# Patient Record
Sex: FEMALE | Race: WHITE
[De-identification: names, ages, dates, MRNs, and addresses within clinical notes are randomized per-mention and may not be internally consistent; named-entity substitution may affect disease eponyms.]

---

## 2018-07-30 ENCOUNTER — HOSPITAL ENCOUNTER (INPATIENT)
Dept: HOSPITAL 68 - ERH | Age: 48
LOS: 8 days | DRG: 885 | End: 2018-08-07
Attending: PSYCHIATRY & NEUROLOGY | Admitting: PSYCHIATRY & NEUROLOGY
Payer: COMMERCIAL

## 2018-07-30 VITALS — HEIGHT: 65 IN | BODY MASS INDEX: 29.99 KG/M2 | WEIGHT: 180 LBS

## 2018-07-30 DIAGNOSIS — F33.2: Primary | ICD-10-CM

## 2018-07-30 DIAGNOSIS — Z87.820: ICD-10-CM

## 2018-07-30 LAB
ABSOLUTE GRANULOCYTE CT: 1.8 /CUMM (ref 1.4–6.5)
BASOPHILS # BLD: 0 /CUMM (ref 0–0.2)
BASOPHILS NFR BLD: 0.7 % (ref 0–2)
EOSINOPHIL # BLD: 0.1 /CUMM (ref 0–0.7)
EOSINOPHIL NFR BLD: 2.8 % (ref 0–5)
ERYTHROCYTE [DISTWIDTH] IN BLOOD BY AUTOMATED COUNT: 14.1 % (ref 11.5–14.5)
GRANULOCYTES NFR BLD: 48.4 % (ref 42.2–75.2)
HCT VFR BLD CALC: 34.7 % (ref 37–47)
LYMPHOCYTES # BLD: 1.4 /CUMM (ref 1.2–3.4)
MCH RBC QN AUTO: 30.1 PG (ref 27–31)
MCHC RBC AUTO-ENTMCNC: 34.2 G/DL (ref 33–37)
MCV RBC AUTO: 88.2 FL (ref 81–99)
MONOCYTES # BLD: 0.4 /CUMM (ref 0.1–0.6)
PLATELET # BLD: 304 /CUMM (ref 130–400)
PMV BLD AUTO: 6.8 FL (ref 7.4–10.4)
RED BLOOD CELL CT: 3.93 /CUMM (ref 4.2–5.4)
WBC # BLD AUTO: 3.7 /CUMM (ref 4.8–10.8)

## 2018-07-30 PROCEDURE — G0463 HOSPITAL OUTPT CLINIC VISIT: HCPCS

## 2018-07-30 PROCEDURE — G0480 DRUG TEST DEF 1-7 CLASSES: HCPCS
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
## 2018-07-30 NOTE — ED PSYCHIATRIC COMPLAINT
History of Present Illness
 
General
Chief Complaint: Psychiatric Related Complaint
Stated Complaint: PSYCH EVAL, DENIES SI OR HI
Source: patient
Exam Limitations: no limitations
 
Vital Signs & Intake/Output
Vital Signs & Intake/Output
 Vital Signs
 
 
Date Time Temp Pulse Resp B/P B/P Pulse O2 O2 Flow FiO2
 
     Mean Ox Delivery Rate 
 
07/31 1119 98.2 95 18 112/63  95 Room Air  
 
07/31 0739 97.4 91 18 108/68  97 Room Air  
 
07/31 0614 97.6 90 16 101/64  95 Room Air Room Air 
 
07/30 2053       Room Air  
 
07/30 1854 97.0 103 18 125/85  97 Room Air  
 
 
 ED Intake and Output
 
 
 07/31 0000 07/30 1200
 
Intake Total  
 
Output Total  
 
Balance  
 
   
 
Patient 180 lb 
 
Weight  
 
Weight Reported by Patient 
 
Measurement  
 
Method  
 
 
 
Allergies
Coded Allergies:
No Known Allergies (07/30/18)
 
Triage Note:
PT STATES HER DEPRESSION IS GETTING BAD.  PT
 CURRENTLY TAKING MEDS AS DIRECTED.  PT STATES THE
 DEPRESSION COMES AND GOES.  PT LAST THERAPY APPT.
 WAS LAST WEDNESDAY BUT PT DID NOT GO TO IT.  PT
 STATES HER MEDS HAS BEEN SWAPPED OUT A FEW TIMES
 OVER THE PAST MONTH.  PT CRYING IN TRIAGE.  PT
 STATES SHE HAS BEEN ON MEDS FOR YEARS "I DON'T
 THINK ANYONE CAN FIX ME".  PT DENIES +SI DENIES HI
 PT STATES I WOULD NEVER DO ANYTHING TO HURT
 MYSELF.
 PT DENIES ETOH OR DRUG USE.
Triage Nurses Notes Reviewed? yes
Onset: Gradual
Duration: getting worse
Timing: remote history
Severity: severe
Severity Numbers: 10
HPI:
Patient is a 48-year-old female with a past medical history depression who 
presents emergency room with boyfriend for concerns of worsening depression and 
suicidal ideation with plans of overdosing on pills.  Patient denies any illicit
drug use smoking or drinking alcohol, denies any history of attempt of suicide 
in the past.  Patient lives with boyfriend at home.  Patient self discontinued 
her Seroquel recently.  However patient is compliant with all of her 
medications.  Patient denies any illness
(Jens Peters)
 
Past History
 
Travel History
Traveled to [place] past 21 day No
 
Medical History
Any Pertinent Medical History? see below for history
Neurological: TBI
Psychiatric: depression
Endocrine: hypothyroidism
 
Surgical History
Surgical History: non-contributory
 
Psychosocial History
What is your primary language English
Tobacco Use: Never used
ETOH Use: occasional use
Illicit Drug Use: denies illicit drug use
 
Family History
Hx Contributory? No
(Jens Peters)
 
Review of Systems
 
Review of Systems
Constitutional:
Reports: no symptoms. 
EENTM:
Reports: no symptoms. 
Respiratory:
Reports: no symptoms. 
Cardiovascular:
Reports: no symptoms. 
GI:
Reports: no symptoms. 
Genitourinary:
Reports: no symptoms. 
Musculoskeletal:
Reports: no symptoms. 
Skin:
Reports: no symptoms. 
Neurological/Psychological:
Reports: see HPI, depressed. 
Hematologic/Endocrine:
Reports: no symptoms. 
Immunologic/Allergic:
Reports: no symptoms. 
All Other Systems: Reviewed and Negative
(Jens Peters)
 
Physical Exam
 
Physical Exam
General Appearance: moderate distress
Head: atraumatic
Eyes:
Bilateral: normal appearance, PERRL. 
Ears, Nose, Throat: normal pharynx, normal ENT inspection
Neck: normal inspection
Respiratory: normal breath sounds, chest non-tender
Cardiovascular: tachycardia
Neurological/Psychiatric: no motor/sensory deficits
Appearance/Memory/Insight: disheveled
Behavoir/Eye Contact/Speech: cooperative
Thoughts/Hallucinations: normal thought pattern, no apparent hallucination
Skin: intact
SAD PERSONS
 
 
SAD PERSONS Response Value
 
Age <19 or >45 years? yes 1
 
Depression/Hopelessness? yes 2
 
Previous Attempts/Psych Care yes 1
 
Organized/Serious Attempt yes 2
 
Social Support? has support 0
 
Stated Future Intent? yes 2
 
Total   8
 
 
SAD PERSONS Done? yes
(Jens Peters)
 
Progress
Differential Diagnosis: drug intoxication, drug overdose, drug withdrawal, 
electrolyte abnormality, encephalitis, hypoglycemia, hypothyroidism, IC hem/mass
/tumor, meningitis
Plan of Care:
 Orders
 
 
Procedure Date/time Status
 
Regular Diet 07/31 B Active
 
Admit to inpatient psych 07/31 1227 Active
 
Continuous Observation Monitor 07/30 2028 Active
 
URINE DRUG SCREEN FOR ER ONLY 07/30 2028 Complete
 
ETHANOL 07/30 2028 Complete
 
COMPREHENSIVE METABOLIC PANEL 07/30 2028 Complete
 
CBC WITHOUT DIFFERENTIAL 07/30 2028 Complete
 
ED CRISIS PSYCH CONSULT 07/30 2028 Active
 
 
 Current Medications
 
 
  Sig/[place] Start time  Last
 
Medication Dose  Stop Time Status Admin
 
Non-Formulary  0 SEE ADMIN CRITERIA 07/31 1215 UNVr 
 
Medication     
 
(NON FORMULARY)     
 
Non-Formulary  0 SEE ADMIN CRITERIA 07/31 1215 UNVr 
 
Medication     
 
(NON FORMULARY)     
 
Vilazodone HCl 40 MG DAILY 07/31 1215 AC 
 
(Viibryd)     
 
Levothyroxine Sodium 0.175 MG DAILY AC 07/31 1139 UNVr 
 
(Synthroid)     
 
Alprazolam 1 MG DAILY 07/31 0900 UNVr 07/31
 
(Xanax)   08/07 0859  0858
 
Non-Formulary  0 SEE ADMIN CRITERIA 07/30 2215 UNVr 
 
Medication     
 
(NON FORMULARY)     
 
Oxcarbazepine 300 MG BID 07/30 2211 UNVr 07/31
 
(Trileptal 150MG Tab)     0858
 
 
 Laboratory Tests
 
 
 
07/30/18 2049:
Urine Opiates Screen 178, Methadone Screen 95, Barbiturate Screen < 60, Ur 
Phencyclidine Scrn < 6.00, Amphetamines Screen < 100, U Benzodiazepines Scrn > 
800  H, Urine Cocaine Screen < 50, Urine Cannabis Screen < 5.00
 
07/30/18 2047:
Anion Gap 9, Estimated GFR > 60, BUN/Creatinine Ratio 18.9, Glucose 93, Calcium 
9.3, Total Bilirubin 0.2, AST 37  H, ALT 51, Alkaline Phosphatase 89, Total 
Protein 6.6, Albumin 3.9, Globulin 2.7, Albumin/Globulin Ratio 1.4, CBC w Diff 
NO MAN DIFF REQ, RBC 3.93  L, MCV 88.2, MCH 30.1, MCHC 34.2, RDW 14.1, MPV 6.8  
L, Gran % 48.4, Lymphocytes % 38.5, Monocytes % 9.6  H, Eosinophils % 2.8, 
Basophils % 0.7, Absolute Granulocytes 1.8, Absolute Lymphocytes 1.4, Absolute 
Monocytes 0.4, Absolute Eosinophils 0.1, Absolute Basophils 0, Serum Alcohol < 
10.0
Patient currently on initial presentation is known to be tearful and depressed, 
blood work was obtained patient was cleared medically however crisis management 
indicate that patient will be a hold over in the emergency room for a bed search
patient's medications were ordered
 
Discussed handoff with Dr. Chaparro
Hand-Off
   Endorsed To:
Krysta PANCHAL,Luis SEVERINO
   Endorsed Time: 0044
(Jens Peters)
Comments:
7/31/2018 7:13:45 AM patient signed out to me by Dr. Chaparro at shift change 
over.
 
7/31/2018 12:28:49 PM per crisis clinician, patient to be admitted to inpatient 
psychiatry.
(Ryann PANCHAL,Rocky JACOBS)
 
Departure
 
Departure
Disposition: STILL A PATIENT
Condition: Stable
Referrals:
Yosef PANCHAL,Phong SEVERINO (PCP/Family)
 
Departure Forms:
Customer Survey
General Discharge Information
(Jens Peters)
 
PA/NP Co-Sign Statement
Statement:
ED Attending supervision documentation-
 
[] I saw and evaluated the patient. I have also reviewed all the pertinent lab 
results and diagnostic results. I agree with the findings and the plan of care 
as documented in the PA's/NP's documentation. 
 
[x] I have reviewed the ED Record and agree with the PA's/NP's documentation.
pt signed out to dr. tan, 7/31/18, 7am. 
 
[] Additions or exceptions (if any) to the PAs/NP's note and plan are 
summarized below:
[]
 
(Krysta PANCHAL,Luis SEVERINO)
 
Departure
Clinical Impression
Primary Impression: Depression
Qualifiers:  Depression Type: major depressive disorder Major depression 
recurrence: recurrent Active/Remission status: currently active Major depression
episode severity: severe Psychotic features: without psychotic features 
Qualified Code: F33.2 - Major depressive disorder, recurrent severe without 
psychotic features
(Ryann PANCHAL,Rocky JACOBS)

## 2018-07-31 VITALS — DIASTOLIC BLOOD PRESSURE: 71 MMHG | SYSTOLIC BLOOD PRESSURE: 115 MMHG

## 2018-07-31 VITALS — SYSTOLIC BLOOD PRESSURE: 130 MMHG | DIASTOLIC BLOOD PRESSURE: 86 MMHG
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
## 2018-08-01 VITALS — SYSTOLIC BLOOD PRESSURE: 128 MMHG | DIASTOLIC BLOOD PRESSURE: 91 MMHG

## 2018-08-01 VITALS — DIASTOLIC BLOOD PRESSURE: 77 MMHG | SYSTOLIC BLOOD PRESSURE: 124 MMHG
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
## 2018-08-01 NOTE — IP INCIDENTAL NOTE PSYCH
Incidental Note
Notation:
Case and treatment plan discussed in team meeting.  Staff reports that the 
patient is denying SI.  Isolated most of the evening.  This morning, patient 
reported that she had slept surprisingly well.  Anxious and depressed.
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
## 2018-08-02 VITALS — DIASTOLIC BLOOD PRESSURE: 68 MMHG | SYSTOLIC BLOOD PRESSURE: 123 MMHG

## 2018-08-02 VITALS — SYSTOLIC BLOOD PRESSURE: 142 MMHG | DIASTOLIC BLOOD PRESSURE: 77 MMHG
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
## 2018-08-02 NOTE — [facility] PROGRESS NOTE PSYCH
Psych (Inpt) Progress Note
Progress Note
Include the following elements, when applicable:
Involvement in the active treatment of the patient with behavioral observations 
of the patient and the patient's response to the treatment.
Review of the ongoing treatment process in the context of the treatment plan.
Indication of how multi-disciplinary staff members are carrying out the 
treatment plan.
Plans for future interventions and recommendations for revision of the treatment
plan.
Liaison with other physicians/providers.
Progress Note:
Case and treatment plan discussed in team meeting.  Staff reports that the 
patient is denying suicidal ideation.  Appeared very depressed last night.  
Patient expressed morbid thoughts in group.  Family meeting was scheduled with 
father for 11:30 AM.
 
Patient seen this morning with , Catherine.  Patient was in group prior 
to meeting with us in office.  Patient reports she is feeling a big difference 
from the addition of lithium.  States it is good.  States she feels happier and 
while she can easily bring herself to tears, she is not tearful all the time 
now.  Tolerating medications well, without complaint.  Affect is brighter but 
she became tearful when expressing worry about whether her relief will be 
temporary or long-lasting.  Reports mood now is pretty good.  Rates sad mood 3.5
/10 and anxiety 5/10.  Feels hopeless and helpless.  Denies feeling worthless or
guilty.  Denies active and passive suicidal ideation.  Last had suicidal 
thoughts yesterday or the day before.  Denies homicidal ideation.  Denies 
auditory and visual hallucinations and paranoid ideation.  Patient thinks that 
she slept well but reports she was up once to use the bathroom.  She felt 
exhausted upon awakening this morning for breakfast.  Patient believes that her 
appetite may be improving from recent baseline.  Reports energy might be 
improving also.
 
Patient was advised to have normal salt intake while on lithium and to avoid 
nonsteroidal anti-inflammatory drugs while on this medication.
 
I attended family meeting with patient, father and  at 11:30 AM.  
During family meeting, patient expressed that she feels damaged and broken from 
her TBI.  Father was very supportive.
 
IMPRESSION:
Slow progress.  Continue present treatment plan.  Appears to be improving with 
the addition of lithium.  We will be checking a lithium level on Saturday 
morning.  Anticipate likely discharge early next week to home and boyfriend with
referral to [facility].

## 2018-08-03 VITALS — SYSTOLIC BLOOD PRESSURE: 102 MMHG | DIASTOLIC BLOOD PRESSURE: 68 MMHG

## 2018-08-03 VITALS — SYSTOLIC BLOOD PRESSURE: 137 MMHG | DIASTOLIC BLOOD PRESSURE: 79 MMHG

## 2018-08-03 NOTE — SOCIAL WORKER PROG NOTE PSYCH
Social Work Progress Note
Progress Note
TC - [facility] - she will call [facility] to find out what her copay is for [facility] 
IOP.  [facility]/ IOP called back NO COPAY for IOP.
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
## 2018-08-03 NOTE — CP SOUTH PROGRESS NOTE PSYCH
Psych (Inpt) Progress Note
Progress Note
Include the following elements, when applicable:
Involvement in the active treatment of the patient with behavioral observations 
of the patient and the patient's response to the treatment.
Review of the ongoing treatment process in the context of the treatment plan.
Indication of how multi-disciplinary staff members are carrying out the 
treatment plan.
Plans for future interventions and recommendations for revision of the treatment
plan.
Liaison with other physicians/providers.
Progress Note:
Case and treatment plan discussed in team meeting.  Staff reports that the 
patient is denying SI.  Thought content described as somewhat morbid.  Cried a 
couple of times last evening.  Appearing fragile with somewhat constricted 
affect.  Has depressed mood.
 
Patient seen at 10:27 AM with Catherine, and with PA student.  Reports 
feeling okay.  She is tearful and states she misses her sister.  Thinks family 
meeting yesterday went wwell.  Jiggling right leg.  Verbal and expressive.  Mood
: "on the verge of tears."  Sad 7/10, anxiety 7/10.  Feels hopeless and 
helpless.  Denies feeling worthless or guilty.  Denies active and passive 
suicidal ideation.  Denies homicidal ideation.  Denies auditory and visual 
hallucinations and paranoid ideation.  Patient reports she had an inspiration 
while taking a shower and arrived at an affirmation.  Reports sleep was 
decreased due to roommate's behavior.  Appetite is reportedly getting better as 
is energy.  Tolerating medications well.  Reports some right neck pain.
 
IMPRESSION:
Slow progress.  Continue present treatment plan.  Patient will be having a 
lithium level in the morning.  Please adjust lithium dose to achieve a blood 
level between 0.7 and 1.0.  We will ask hospitalist to consult regarding right 
neck pain.  Anticipate likely discharge early next week.  Patient will be 
returning home and be referred to IOP.

## 2018-08-04 VITALS — DIASTOLIC BLOOD PRESSURE: 77 MMHG | SYSTOLIC BLOOD PRESSURE: 129 MMHG

## 2018-08-04 VITALS — DIASTOLIC BLOOD PRESSURE: 72 MMHG | SYSTOLIC BLOOD PRESSURE: 119 MMHG

## 2018-08-04 NOTE — CP SOUTH PROGRESS NOTE PSYCH
Psych (Inpt) Progress Note
Progress Note
Pt notes that she has yet to cry today. However, during the course of the 
interview she became quite tearful. She spoke about a variety of topics, most 
notably that she does plan to continue her medications, even when she is feeling
better, and she realized that she has a pattern of going off meds when she feels
good to her detrement. She spoke abut feeling as though she is a "people 
pleaser." Discussed setting boundaries with her as seems to be a life-long 
issue. She recounted recent family meeting with father. Denies SI or HI. 
 
 Current Medications
 
 
  Sig/Jefferson Start time  Last
 
Medication Dose Route Stop Time Status Admin
 
Acetaminophen 650 MG Q6P PRN 08/01 1345 DC 08/03
 
  PO   0748
 
Al Hydroxide/Mg  30 ML Q4-6 PRN PRN 07/31 1500 AC 
 
Hydroxide  PO   
 
Benztropine Mesylate 1 MG Q6P PRN 08/01 1345 AC 
 
  IM   
 
Benztropine Mesylate 1 MG Q6P PRN 07/31 1515 AC 
 
  PO   
 
Clonazepam 0.5 MG AT BEDTIME 08/01 2100 AC 08/03
 
  PO 08/08 2059  2254
 
Gabapentin 300 MG Q6P PRN 08/01 1345 AC 
 
  PO   
 
Haloperidol 5 MG Q6P PRN 08/01 1345 AC 
 
  IM   
 
Haloperidol 5 MG Q6P PRN 07/31 1515 AC 
 
  PO   
 
Ibuprofen 400 MG PC PRN 08/03 1630 DC 08/04
 
  PO   0943
 
Levothyroxine Sodium 0.175 MG DAILY AC 07/31 1139 AC 08/04
 
  PO   0642
 
Lithium Carbonate 300 MG 0800 08/05 0800 AC 
 
  PO   
 
Lithium Carbonate 600 MG 2000 08/04 2000 AC 
 
  PO   
 
Lithium Carbonate 300 MG 0800,2000 08/01 1715 DC 08/04
 
  PO   1147
 
Lorazepam 2 MG Q6P PRN 08/01 1345 AC 
 
  IM   
 
Magnesium Hydroxide 30 ML AT BEDTIME AS NEED.. 07/31 1500 AC 
 
  PO   
 
Oxcarbazepine 600 MG DAILY 08/03 0900 AC 08/04
 
  PO   0939
 
Vilazodone HCl 40 MG DAILY 08/04 0900 AC 08/04
 
  PO   0940
 
 
 Laboratory Tests
 
 
 08/04 08/01
 
 0645 1540
 
Toxicology  
 
  Lithium (0.6 - 1.2 mmol/L) 0.4  L 
 
Urines  
 
  Urine Pregnancy Test  NEGATIVE
 
 
Vital Signs
 
 
Date Time Temp Pulse Resp B/P B/P Pulse O2 O2 Flow FiO2
 
     Mean Ox Delivery Rate 
 
08/04 0750 97.4 87  119/72     
 
08/03 1938 97.8 100  137/79     
 
 
 
MSE
Appearance: as stated age
Speech :  nl rate, rhythm, volume and prosody  
Behavior: cooperative
Motor:   no psychomotor retardation
Mood : OK
Affect : flat, labile, tearful at times, laughing at others irritable, 
appropriate, constricted
Thought process:  linear and goal directed
Thought content : no delusions or paranoia
Perceptions: denied AVHs, denied SI or HI
Insight: poor
Judgment:  poor
 
A/P: Pt with hx of TBI and depression with marked mood lability. 
 
- Increase lithium to 300mg at AM and 600mg at PM, recheck level ordered
- Otherwise continue current medication regimen

## 2018-08-05 VITALS — DIASTOLIC BLOOD PRESSURE: 65 MMHG | SYSTOLIC BLOOD PRESSURE: 116 MMHG

## 2018-08-05 VITALS — DIASTOLIC BLOOD PRESSURE: 86 MMHG | SYSTOLIC BLOOD PRESSURE: 144 MMHG

## 2018-08-05 NOTE — CP SOUTH PROGRESS NOTE PSYCH
Psych (Inpt) Progress Note
Progress Note
Pt took increase dose of lithium overnight. Denies issues. Spoke at length about
having a "big heart" and expressed concerns for others. Reminder her of extended
conversation on boundaries and focusing on her own recovery and treatment rather
than others. She became quite tearful because she wants the therapy dog to 
visit.  Denies SI or HI.
 
 Current Medications
 
 
  Sig/Jefferson Start time  Last
 
Medication Dose Route Stop Time Status Admin
 
Al Hydroxide/Mg  30 ML Q4-6 PRN PRN 07/31 1500 AC 
 
Hydroxide  PO   
 
Benztropine Mesylate 1 MG Q6P PRN 08/01 1345 AC 
 
  IM   
 
Benztropine Mesylate 1 MG Q6P PRN 07/31 1515 AC 
 
  PO   
 
Clonazepam 0.5 MG AT BEDTIME 08/01 2100 AC 08/04
 
  PO 08/08 2059  2208
 
Gabapentin 300 MG Q6P PRN 08/01 1345 AC 
 
  PO   
 
Haloperidol 5 MG Q6P PRN 08/01 1345 AC 
 
  IM   
 
Haloperidol 5 MG Q6P PRN 07/31 1515 AC 
 
  PO   
 
Levothyroxine Sodium 0.175 MG DAILY AC 07/31 1139 AC 08/05
 
  PO   0628
 
Lidocaine 1 PAT Q24H PRN 08/04 1715 AC 08/05
 
  TOP   0849
 
Lithium Carbonate 300 MG 0800 08/05 0800 AC 08/05
 
  PO   0841
 
Lithium Carbonate 600 MG 2000 08/04 2000 AC 08/04
 
  PO   2007
 
Lorazepam 2 MG Q6P PRN 08/01 1345 AC 
 
  IM   
 
Magnesium Hydroxide 30 ML AT BEDTIME AS NEED.. 07/31 1500 AC 
 
  PO   
 
Oxcarbazepine 600 MG DAILY 08/03 0900 AC 08/05
 
  PO   0841
 
Vilazodone HCl 40 MG DAILY 08/04 0900 AC 08/05
 
  PO   0841
 
 
 Laboratory Tests
 
 
 08/04
 
 0645
 
Toxicology 
 
  Lithium (0.6 - 1.2 mmol/L) 0.4  L
 
 
Vital Signs
 
 
Date Time Temp Pulse Resp B/P B/P Pulse O2 O2 Flow FiO2
 
     Mean Ox Delivery Rate 
 
08/05 0752 97.7 93  116/65     
 
08/04 1952 98.2 92  129/77     
 
 
 
MSE
Appearance: as stated age
Speech :  nl rate, rhythm, volume and prosody  
Behavior: cooperative
Motor:   no psychomotor retardation
Mood : not too good
Affect : labile, extremely tearful at times, appropriate, constricted
Thought process:  linear and goal directed
Thought content : no delusions or paranoia
Perceptions: denied AVHs, denied SI or HI
Insight: poor
Judgment:  poor
 
A/P: Pt with hx of TBI and depression with marked mood lability. 
 
- continue current medication regimen

## 2018-08-06 VITALS — DIASTOLIC BLOOD PRESSURE: 72 MMHG | SYSTOLIC BLOOD PRESSURE: 111 MMHG

## 2018-08-06 VITALS — DIASTOLIC BLOOD PRESSURE: 76 MMHG | SYSTOLIC BLOOD PRESSURE: 113 MMHG

## 2018-08-06 NOTE — SOCIAL WORKER PROG NOTE PSYCH
Social Work Progress Note
Progress Note
mUu reported her weekend to be boring.  She filled her time with playing 
games and watching movies.  She reported that she is still crying multiple times
per day.  As she talked about it, she broke down crying.  She cried for the at 
least 30 minutes while we talked.  She has fears at this point about going home 
and her patterns and thinking continuing to be negative.  She sees herself not 
functioning.  She stated that stress right now is very overwhelming and she just
can't handle anything.  We talked about the idea of her taking medical leave 
from work.  She hasn't spoken with her supervisor and has been avoiding doing 
so.  She feels guilty.  She feels she is abandoning him.  She also can't see 
herself working right now.  I recommended that she not go back right now and see
how she is once she starts IOP.  She is continuing to have lots of thoughts 
about her life just "existing" and that she has nothing to look forward to.  
Denies SI.  Encouraged her to work on the goal of identifying some positive 
strengths.

## 2018-08-06 NOTE — CP SOUTH PROGRESS NOTE PSYCH
Psych (Inpt) Progress Note
Progress Note
Include the following elements, when applicable:
Involvement in the active treatment of the patient with behavioral observations 
of the patient and the patient's response to the treatment.
Review of the ongoing treatment process in the context of the treatment plan.
Indication of how multi-disciplinary staff members are carrying out the 
treatment plan.
Plans for future interventions and recommendations for revision of the treatment
plan.
Liaison with other physicians/providers.
Progress Note:
Dr. Parada's notes reviewed.  Lithium level was low and dose was increased.  
Case and treatment plan discussed in team meeting.  Denying SI.  Reporting mood 
is okay.  Feeling a little better.   informed me that the patient 
remains labile.
 
 Lab
 
 
Lithium 0.4 mmol/L L 08/04/18 0645
 
Lithium 0.6 mmol/L 08/06/18 0636
 
 
Patient seen at 1:58 pm.  The patient was in group prior to meeting with me in 
office.  Reports the weekend was very, very boring with little structure.  
Reports that she played a few games and watched a few movies.  Affect is calm 
and blunted to depressed.  Reports she still cries a lot.  Reports in general 
her mood is almost kind of back to baseline, which she describes as happy, 
outgoing and social on the outside, but a millimeter below that, she is "just 
like this," referring to depression.  Rates sad mood 8.5/10 and anxiety about 9/
10.  Feels kind of hopeless and helpless.  Denies feeling worthless.  Does feel 
guilty.  Reports having passive suicidal ideation, not active.  She reports 
right shoulder and left foot pain.  She was advised to avoid nonsteroidal anti-
inflammatory drugs whle on lithium so I ordered Tylenol 1000 mg, as she reports 
that regular strength does not work.  Denies homicidal ideation.  Denies 
auditory and visual hallucinations and paranoid ideation.  Reports sleep is 
okay.  Describes appetite as always eating.  Energy varies.  Tolerating 
medications well.  Patient would like to take some time off from work, as she 
finds work to be a major stressor.
 
IMPRESSION: 
Slow progress.  Continue present treatment plan.  We continue to assess day-by-
day for discharge-readiness.  I ordered a repeat lithium level for tomorrow 
morning, as today's level was not at steady-state.

## 2018-08-07 VITALS — SYSTOLIC BLOOD PRESSURE: 107 MMHG | DIASTOLIC BLOOD PRESSURE: 68 MMHG

## 2018-08-07 NOTE — SOCIAL WORKER PROG NOTE PSYCH
Social Work Progress Note
Faxed Referral(s)
   Referred To: [facility]
   Transition of Care Documents sent: Health Summary
   Faxed to: [facility]
   Fax #: [phone]
   Faxed by: Catherine Hoang
   Date faxed: 08/07/18
   Time Faxed: [phone]

## 2018-08-07 NOTE — CP SOUTH PROGRESS NOTE PSYCH
Psych (Inpt) Progress Note
Progress Note
Include the following elements, when applicable:
Involvement in the active treatment of the patient with behavioral observations 
of the patient and the patient's response to the treatment.
Review of the ongoing treatment process in the context of the treatment plan.
Indication of how multi-disciplinary staff members are carrying out the 
treatment plan.
Plans for future interventions and recommendations for revision of the treatment
plan.
Liaison with other physicians/providers.
Progress Note:
Case and treatment plan discussed in team meeting.  Staff reports that the 
patient is denying SI.  Displaying some lability.  Lithium level 0.8.
 
Patient seen with  and medical student at 10:50 AM.  Patient was in
group prior to meeting with us in office.  Reports "today, I'm doing alright."  
Reports she is feeling kind of good today.  Reports she did a good deed, having 
given away all of her clothes on the unit to her roommate, who is homeless.  
Patient states the gift was not of great value and the patient reports having 
plenty of clothes at home.  We do not view this as having been manic or 
impulsive behavior.  Affect is calm and blunted.  Rates sad mood 2/10 and 
anxiety probably 3.5/10.  Denies feeling hopeless, helpless, worthless or 
guilty.  Denies active and passive SI, HI, AH, VH and PI.  She thought she had 
slept well but felt really tired upon awakening.  Reports appetite, she guesses,
is alright today.  Energy is low-normal.  Tolerating medications well.  Feels 
ready and safe for discharge.
 
Patient's pharmacy, [facility] in [place], contacted me because of potential 
drug-drug interaction between lithium and Viibryd.  Apparently the risk is 
serotonin syndrome.  I saw the patient in the hospital lobby after discharge and
informed her that there is a possible drug-drug interaction "serotonin syndrome
" and that we will monitor for this in [facility].
 
IMPRESSION:
Condition improved.  Okay for discharge today to home and boyfriend with 
referral to [facility].

## 2018-08-07 NOTE — SOCIAL WORKER PROG NOTE PSYCH
Social Work Progress Note
Progress Note
Dr. Goyal and I met with Umu this morning.  She looked much better than 
yesterday.  Stated her mood was alright and she was feeling good.  She rates sad
feelings today at a 2 (0-10, 10 being severe) and anxiety a 3 and 1/2.  Reports 
no SI/HI.  Denies feelings of hopelessness, helplessness, worthlessness, or 
guilt.  She reported feeling good because she did a "good deed".  Stated she 
gave clothes to her roommate who is homeless.  She didn't feel that she needed 
the clothes, stating she has lots of clothes at home.  Dr. Goyal informed her 
that we are looking to discharge her today.  She was okay with that plan, but a 
little nervous.  Asked if she has spoken with Oscar or family about leaving soon
?  She said she had spoken with Oscar and he was going to pick her up at 6pm 
after work.  Asked if there was anyone available to pick her up earlier?  She 
said her Father is away.  She mentioned trying Oscar's Mother but she only knew 
her home number.  She attempted to call and left a message.  She doesn't have 
her neighbors phone number or else she would call.  She doesn't have her wallet 
here.
 
Called [facility] and scheduled intake for Weds 8/8 at 11:30am.  Umu's 
significant other's Mother is picking her up about 2:30pm.

## 2018-08-07 NOTE — PATIENT DISCHARGE INSTRUCTIONS
Psych Discharge Inst
 
General Discharge Information
Reason for Admission:
SI, "burst out crying, unbearable."
Psy Discharge Primary Diag+ Major depression,    recurrent, severe
Psy Discharge Secondary Diag+ History of TBI
Summary Tests/Major Procedures
 Lab
 
 
ALT 51 U/L 07/30/18 2047
 
AST 37 U/L H 07/30/18 2047
 
BUN 17 mg/dL 07/30/18 2047
 
Calcium 9.3 mg/dL 07/30/18 2047
 
Carbon Dioxide 27 mmol/L 07/30/18 2047
 
Chloride 103 mmol/L 07/30/18 2047
 
Cholesterol 221 MG/DL H 07/30/18 2047
 
Cholesterol/HDL Ratio 4 % 07/30/18 2047
 
Creatinine 0.9 mg/dL 07/30/18 2047
 
Estimated GFR > 60 ml/min 07/30/18 2047
 
Glucose 93 mg/dL 07/30/18 2047
 
HDL Cholesterol 60 mg/dL 07/30/18 2047
 
Hemoglobin A1c 5.4 % 07/30/18 2047
 
LDL Cholesterol, Calc 140 mg/dL H 07/30/18 2047
 
Potassium 4.6 mmol/L 07/30/18 2047
 
Sodium 139 mmol/L 07/30/18 2047
 
TSH &T3 &Free T4 Intrp 0.952 uIU/mL 07/30/18 2047
 
Triglycerides 106 mg/dL 07/30/18 2047
 
Hct 34.7 % L 07/30/18 2047
 
Hgb 11.9 G/DL L 07/30/18 2047
 
MPV 6.8 FL L 07/30/18 2047
 
Monocytes % 9.6 % H 07/30/18 2047
 
Plt Count 304 /CUMM 07/30/18 2047
 
RBC 3.93 /CUMM L 07/30/18 2047
 
WBC 3.7 /CUMM L 07/30/18 2047
 
Lithium 0.4 mmol/L L 08/04/18 0645
 
Lithium 0.6 mmol/L 08/06/18 0636
 
Lithium 0.8 mmol/L 08/07/18 0620
 
Serum Alcohol < 10.0 MG/DL 07/30/18 2047
 
U Benzodiazepines Scrn > 800 NG/ML H 07/30/18 2049
 
Urine Pregnancy Test NEGATIVE 08/01/18 1540
 
 
EKG 8/1/18 was normal.
Studies Pending at [place]:
None.
 
Patient Instructions
Contact Information
Your Psychiatrist on [facility] was Luis Goyal MD
 
* If you are experiencing an emergency related to this hospitalization, please 
call [phone] to contact the treating psychiatrist or the psychiatrist-on-
call.
 
* To Request a copy of your medical records, please contact the Medical Records 
Department at [phone].
 
* To request results of studies pending at the time of discharge, please call 
[phone].
 
* Continue your Medications until directed to stop by your Healthcare provider.
 
General Medication Information
Please continue to take your new medications and your continued home medications
, unless otherwise indicated on your discharge medication list, or unless 
directed by your MD or APRN to stop them.
 
 
Special Instructions
Diet Regular
Activity Normal
Other Inst/Recommendations See PCP for abnormal labs listed above.  No NSAIDs on
lithium.
- Tobacco Use Treatment Offered
Post DC Medications Offered: Not Applicable
Post DC Tobacco Treatment Plan: Not Applicable
- EtOH/Drug Use D/O Treatment Offered
Post DC Medications Offered: NA-No EtOH/Drug Use D/O
Post DC EtOH/SubAbuse [place] Plan: NA-No EtOH/Drug Use D/O
Metabolic Screening
([x]) Not Applicable, patient not on a neuroleptic.
 
 OR
 
() Patient on a neuroleptic(s) .
     Enter below results for Hemoglobin A1C, 
     and lipid panel if obtained during the last 365 days.
 
BMI: 29.900     
 
Blood Pressure: 107/68
 
Laboratory Results From [facility] (If applicable):
 
 
 
Advance Directives
Does the Patient have Medical Advance Directives No/Refused further info
Does Pt have Psychiatric Advance Directives? No/Refused further info
Does Patient have a Designated Surrogate Decision Maker: No
Information About Psychiatric Advance Directives Provided? Refused
 
Discharge Plan
Post Hospital Treatment Plan:
[facility] IOP intake this week.
Return to home and boyfriend.

## 2018-08-07 NOTE — DISCHARGE SUMMARY REPORT-PSYCH
Visit Information
 
Visit Dates/Diagnosis'
Admission Date:
07/31/18
 
Discharge Date: 08/07/18
Reason for Admission:
SI, "burst out crying, unbearable."
Psy Discharge Primary Diag: Major depression,    recurrent, severe
Psy Discharge Secondary Diag: History of TBI
 
Hospital Course
Significant Lab Findings:
Lab
 
 
ALT 51 U/L 07/30/18 2047
 
AST 37 U/L H 07/30/18 2047
 
BUN 17 mg/dL 07/30/18 2047
 
Calcium 9.3 mg/dL 07/30/18 2047
 
Carbon Dioxide 27 mmol/L 07/30/18 2047
 
Chloride 103 mmol/L 07/30/18 2047
 
Cholesterol 221 MG/DL H 07/30/18 2047
 
Cholesterol/HDL Ratio 4 % 07/30/18 2047
 
Creatinine 0.9 mg/dL 07/30/18 2047
 
Estimated GFR > 60 ml/min 07/30/18 2047
 
Glucose 93 mg/dL 07/30/18 2047
 
HDL Cholesterol 60 mg/dL 07/30/18 2047
 
Hemoglobin A1c 5.4 % 07/30/18 2047
 
LDL Cholesterol, Calc 140 mg/dL H 07/30/18 2047
 
Potassium 4.6 mmol/L 07/30/18 2047
 
Sodium 139 mmol/L 07/30/18 2047
 
TSH &T3 &Free T4 Intrp 0.952 uIU/mL 07/30/18 2047
 
Triglycerides 106 mg/dL 07/30/18 2047
 
Hct 34.7 % L 07/30/18 2047
 
Hgb 11.9 G/DL L 07/30/18 2047
 
MPV 6.8 FL L 07/30/18 2047
 
Monocytes % 9.6 % H 07/30/18 2047
 
Plt Count 304 /CUMM 07/30/18 2047
 
RBC 3.93 /CUMM L 07/30/18 2047
 
WBC 3.7 /CUMM L 07/30/18 2047
 
Lithium 0.4 mmol/L L 08/04/18 0645
 
Lithium 0.6 mmol/L 08/06/18 0636
 
Lithium 0.8 mmol/L 08/07/18 0620
 
Serum Alcohol < 10.0 MG/DL 07/30/18 2047
 
U Benzodiazepines Scrn > 800 NG/ML H 07/30/18 2049
 
Urine Pregnancy Test NEGATIVE 08/01/18 1540
 
 
EKG 8/1/18 was normal.
 
Course
Complications:
None.
Consultations:
The patient was seen by Dr. Baron for admission H&P.  Per his note of 8/1/18,
"Assessment:
Admission female is admitted for increasing depression.  She has a history of 
hypothyroidism and traumatic brain injury in the past.  Her last thyroid 
function test is pending at this time and for now we can continue her previous 
dose of 175 mcg.  CBCs electrolytes and liver functions otherwise acceptable and
reasonable and she does not require any other workup or treatment from medical 
standpoint."
 
Allergies:
Coded Allergies:
No Known Allergies (07/30/18)
 
Hospital Course/TX Response:
The patient was monitored on the unit for safety and mood disorder.  She 
participated in multi-modal treatments on the unit.
 
Viibryd was continued.  Klonopin 0.5 mg qhs replaced Xanax XR, which is not on-
formulary here.
Lithium augmentation proved beneficial for improving mood.  Lability has 
improved.  SI has remitted.
 
Progress note from date of discharge, 8/7/18:
"Case and treatment plan discussed in team meeting.  Staff reports that the 
patient is denying SI.  Displaying some lability.  Lithium level 0.8.
 
Patient seen with  and medical student at 10:50 AM.  Patient was in
group prior to meeting with us in office.  Reports "today, I'm doing alright."  
Reports she is feeling kind of good today.  Reports she did a good deed, having 
given away all of her clothes on the unit to her roommate, who is homeless.  
Patient states the gift was not of great value and the patient reports having 
plenty of clothes at home.  We do not view this as having been manic or 
impulsive behavior.  Affect is calm and blunted.  Rates sad mood 2/10 and 
anxiety probably 3.5/10.  Denies feeling hopeless, helpless, worthless or 
guilty.  Denies active and passive SI, HI, AH, VH and PI.  She thought she had 
slept well but felt really tired upon awakening.  Reports appetite, she guesses,
is alright today.  Energy is low-normal.  Tolerating medications well.  Feels 
ready and safe for discharge.
 
Patient's pharmacy, [facility] in [place], contacted me because of potential 
drug-drug interaction between lithium and Viibryd.  Apparently the risk is 
serotonin syndrome.  I saw the patient in the hospital lobby after discharge and
informed her that there is a possible drug-drug interaction "serotonin syndrome
" and that we will monitor for this in IOP.
 
IMPRESSION:
Condition improved.  Okay for discharge today to home and boyfriend with 
referral to IOP."
 
Discharge HBIPS
- Tobacco Use Treatment Offered
Post DC Medications Offered: Not Applicable
Post DC Tobacco Treatment Plan: Not Applicable
- EtOH/Drug Use D/O Treatment Offered
Post DC Medications Offered: NA-No EtOH/Drug Use D/O
Post DC EtOH/SubAbuse TX Plan: NA-No EtOH/Drug Use D/O
 
Metabolic Screening
- Screen if on a Neuroleptic Medication
- Metabolic screening should include:
- Blood Pressure, BMI, Glucose or Hgb A1c, & a
- Lipid profile from within the past 365 days.
Metabolic Screening
([x]) Not Applicable, patient not on a neuroleptic.
 
 OR
 
() Patient on a neuroleptic(s) .
     Enter below results for Hemoglobin A1C, 
     and lipid panel if obtained during the last 365 days.
 
BMI: 29.900     
 
Blood Pressure: 107/68
 
Laboratory Results From [facility] E[facility] (If applicable):
 
 
 
Discharge Instructions
 
General Discharge Information
Multiple Neuroleptics:
([x]) Not Applicable
      
     OR
   
 Document below three failed attempts at monotherapy, or a plan to taper to 
 monotherapy, or augmentation of Clozapine.
 ()
 
Discharge Diet Regular
Discharge Activity Normal
DC Disposition:
Returning to home and boyfriend.
Referrals
Ordered Referrals
[facility] 08/08/18
[address] [address]
[phone]
 
[facility] 
Program  
Intake 8/8/18 11:30am  
[address]  
[place] [address]  
[phone]
 
 
 
Prescriptions
Stop taking the following medications:
Alprazolam (Alprazolam ER) 1 MG TAB.ER.24H ORAL DAILY Qty = 30
 
Continue taking these medications:
Vilazodone (Viibryd) 40 MG TABLET
    1 Tablet ORAL DAILY
    Qty = 30
    Comments:
       Last Taken:8/7/18
             Time:8AM
 
Oxcarbazepine (Oxcarbazepine) 300 MG TABLET
    2 Tablet ORAL DAILY
    Qty = 60
    Comments:
       Last Taken:8/7/18
             Time:8AM
 
Atomoxetine HCl (Strattera) 80 MG CAPSULE
    1 Capsule ORAL Every Morning
    Qty = 30
    Comments:
       Last Taken:8/7/18
             Time:8AM
 
Levothyroxine Sodium (Synthroid) 175 MCG TABLET
    1 Tablet ORAL DAILY BEFORE BREAKFAST
    Qty = 90
    Comments:
       Last Taken:8/7/18
             Time:7AM
 
Start taking the following new medications:
Clonazepam (Klonopin) 0.5 MG TABLET
    1 Tablet ORAL AT BEDTIME
    Qty = 14
    No Refills
    Comments:
       Last Taken:8/6/18
             Time:10PM
 
Lidocaine (Lidoderm) 5 % ADH..PATCH
    1 Patch On the skin Q24H as needed for PAIN
    Qty = 14
    No Refills
    Comments:
       Last Taken:8/5/18
             Time:9AM
 
Lithium Carbonate (Lithium Carbonate) 300 MG CAPSULE
    1 Capsule ORAL SEE INSTRUCTIONS
    Qty = 42
    No Refills
    Instructions:
       take 1 po qAM and 2 po qhs
    Comments:
       Last Taken:8/7/18
             Time:8AM
 
 
Other Inst/Recommendations See PCP for abnormal labs listed above.  No NSAIDs on
lithium.
Studies Pending at Discharge
None.
Copies To:
[facility]